# Patient Record
Sex: MALE | Race: WHITE | Employment: OTHER | URBAN - METROPOLITAN AREA
[De-identification: names, ages, dates, MRNs, and addresses within clinical notes are randomized per-mention and may not be internally consistent; named-entity substitution may affect disease eponyms.]

---

## 2020-08-05 NOTE — PROGRESS NOTES
Pre assessment phone call placed. No answer, VM left with return number for questions. Instructed of arrival time to hospital at 1030 am. Advised of one visitor allowed and NPO after midnight.

## 2020-08-06 ENCOUNTER — HOSPITAL ENCOUNTER (INPATIENT)
Dept: CARDIAC CATH/INVASIVE PROCEDURES | Age: 75
LOS: 3 days | Discharge: HOME | DRG: 244 | End: 2020-08-09
Attending: INTERNAL MEDICINE | Admitting: INTERNAL MEDICINE
Payer: MEDICARE

## 2020-08-06 DIAGNOSIS — I48.0 PAF (PAROXYSMAL ATRIAL FIBRILLATION) (HCC): ICD-10-CM

## 2020-08-06 DIAGNOSIS — Z95.820 S/P ANGIOPLASTY WITH STENT: ICD-10-CM

## 2020-08-06 DIAGNOSIS — I25.118 CORONARY ARTERY DISEASE OF NATIVE ARTERY OF NATIVE HEART WITH STABLE ANGINA PECTORIS (HCC): ICD-10-CM

## 2020-08-06 DIAGNOSIS — R94.39 ABNORMAL STRESS ECHO: ICD-10-CM

## 2020-08-06 DIAGNOSIS — Z95.0 PACEMAKER: ICD-10-CM

## 2020-08-06 DIAGNOSIS — R00.1 BRADYCARDIA: ICD-10-CM

## 2020-08-06 LAB
ANION GAP SERPL CALC-SCNC: 6 MMOL/L (ref 7–16)
ATRIAL RATE: 48 BPM
ATRIAL RATE: 86 BPM
BUN SERPL-MCNC: 14 MG/DL (ref 8–23)
CALCIUM SERPL-MCNC: 9.5 MG/DL (ref 8.3–10.4)
CALCULATED P AXIS, ECG09: 21 DEGREES
CALCULATED P AXIS, ECG09: 48 DEGREES
CALCULATED R AXIS, ECG10: 56 DEGREES
CALCULATED R AXIS, ECG10: 9 DEGREES
CALCULATED T AXIS, ECG11: -27 DEGREES
CALCULATED T AXIS, ECG11: -40 DEGREES
CHLORIDE SERPL-SCNC: 107 MMOL/L (ref 98–107)
CO2 SERPL-SCNC: 27 MMOL/L (ref 21–32)
CREAT SERPL-MCNC: 1.28 MG/DL (ref 0.8–1.5)
DIAGNOSIS, 93000: NORMAL
DIAGNOSIS, 93000: NORMAL
ERYTHROCYTE [DISTWIDTH] IN BLOOD BY AUTOMATED COUNT: 11.9 % (ref 11.9–14.6)
GLUCOSE SERPL-MCNC: 94 MG/DL (ref 65–100)
HCT VFR BLD AUTO: 49.6 % (ref 41.1–50.3)
HGB BLD-MCNC: 16.8 G/DL (ref 13.6–17.2)
INR PPP: 0.8
MAGNESIUM SERPL-MCNC: 2.2 MG/DL (ref 1.8–2.4)
MCH RBC QN AUTO: 36.1 PG (ref 26.1–32.9)
MCHC RBC AUTO-ENTMCNC: 33.9 G/DL (ref 31.4–35)
MCV RBC AUTO: 106.7 FL (ref 79.6–97.8)
NRBC # BLD: 0 K/UL (ref 0–0.2)
P-R INTERVAL, ECG05: 172 MS
P-R INTERVAL, ECG05: 188 MS
PLATELET # BLD AUTO: 228 K/UL (ref 150–450)
PMV BLD AUTO: 10.6 FL (ref 9.4–12.3)
POTASSIUM SERPL-SCNC: 3.7 MMOL/L (ref 3.5–5.1)
PROTHROMBIN TIME: 11.6 SEC (ref 12–14.7)
Q-T INTERVAL, ECG07: 350 MS
Q-T INTERVAL, ECG07: 498 MS
QRS DURATION, ECG06: 76 MS
QRS DURATION, ECG06: 80 MS
QTC CALCULATION (BEZET), ECG08: 418 MS
QTC CALCULATION (BEZET), ECG08: 444 MS
RBC # BLD AUTO: 4.65 M/UL (ref 4.23–5.6)
SODIUM SERPL-SCNC: 140 MMOL/L (ref 136–145)
VENTRICULAR RATE, ECG03: 48 BPM
VENTRICULAR RATE, ECG03: 86 BPM
WBC # BLD AUTO: 6.9 K/UL (ref 4.3–11.1)

## 2020-08-06 PROCEDURE — 65660000000 HC RM CCU STEPDOWN

## 2020-08-06 PROCEDURE — 80048 BASIC METABOLIC PNL TOTAL CA: CPT

## 2020-08-06 PROCEDURE — 93005 ELECTROCARDIOGRAM TRACING: CPT | Performed by: INTERNAL MEDICINE

## 2020-08-06 PROCEDURE — 74011000250 HC RX REV CODE- 250: Performed by: INTERNAL MEDICINE

## 2020-08-06 PROCEDURE — C1769 GUIDE WIRE: HCPCS

## 2020-08-06 PROCEDURE — C1725 CATH, TRANSLUMIN NON-LASER: HCPCS

## 2020-08-06 PROCEDURE — 85610 PROTHROMBIN TIME: CPT

## 2020-08-06 PROCEDURE — 74011000258 HC RX REV CODE- 258: Performed by: INTERNAL MEDICINE

## 2020-08-06 PROCEDURE — 74011250636 HC RX REV CODE- 250/636: Performed by: INTERNAL MEDICINE

## 2020-08-06 PROCEDURE — 93458 L HRT ARTERY/VENTRICLE ANGIO: CPT

## 2020-08-06 PROCEDURE — 77030012468 HC VLV BLEEDBK CNTRL ABBT -B

## 2020-08-06 PROCEDURE — B2151ZZ FLUOROSCOPY OF LEFT HEART USING LOW OSMOLAR CONTRAST: ICD-10-PCS | Performed by: INTERNAL MEDICINE

## 2020-08-06 PROCEDURE — 4A023N7 MEASUREMENT OF CARDIAC SAMPLING AND PRESSURE, LEFT HEART, PERCUTANEOUS APPROACH: ICD-10-PCS | Performed by: INTERNAL MEDICINE

## 2020-08-06 PROCEDURE — 92920 PRQ TRLUML C ANGIOP 1ART&/BR: CPT

## 2020-08-06 PROCEDURE — 74011636320 HC RX REV CODE- 636/320: Performed by: INTERNAL MEDICINE

## 2020-08-06 PROCEDURE — B2111ZZ FLUOROSCOPY OF MULTIPLE CORONARY ARTERIES USING LOW OSMOLAR CONTRAST: ICD-10-PCS | Performed by: INTERNAL MEDICINE

## 2020-08-06 PROCEDURE — 77030015766

## 2020-08-06 PROCEDURE — 99153 MOD SED SAME PHYS/QHP EA: CPT

## 2020-08-06 PROCEDURE — 74011250637 HC RX REV CODE- 250/637: Performed by: INTERNAL MEDICINE

## 2020-08-06 PROCEDURE — 92928 PRQ TCAT PLMT NTRAC ST 1 LES: CPT

## 2020-08-06 PROCEDURE — 027034Z DILATION OF CORONARY ARTERY, ONE ARTERY WITH DRUG-ELUTING INTRALUMINAL DEVICE, PERCUTANEOUS APPROACH: ICD-10-PCS | Performed by: INTERNAL MEDICINE

## 2020-08-06 PROCEDURE — 77030029997 HC DEV COM RDL R BND TELE -B

## 2020-08-06 PROCEDURE — 85027 COMPLETE CBC AUTOMATED: CPT

## 2020-08-06 PROCEDURE — C1874 STENT, COATED/COV W/DEL SYS: HCPCS

## 2020-08-06 PROCEDURE — 77030016699 HC CATH ANGI DX INFN1 CARD -A

## 2020-08-06 PROCEDURE — C1887 CATHETER, GUIDING: HCPCS

## 2020-08-06 PROCEDURE — 83735 ASSAY OF MAGNESIUM: CPT

## 2020-08-06 PROCEDURE — C1894 INTRO/SHEATH, NON-LASER: HCPCS

## 2020-08-06 PROCEDURE — 99152 MOD SED SAME PHYS/QHP 5/>YRS: CPT

## 2020-08-06 RX ORDER — SODIUM CHLORIDE 9 MG/ML
75 INJECTION, SOLUTION INTRAVENOUS CONTINUOUS
Status: DISCONTINUED | OUTPATIENT
Start: 2020-08-06 | End: 2020-08-09 | Stop reason: HOSPADM

## 2020-08-06 RX ORDER — MIDAZOLAM HYDROCHLORIDE 1 MG/ML
.5-2 INJECTION, SOLUTION INTRAMUSCULAR; INTRAVENOUS
Status: DISCONTINUED | OUTPATIENT
Start: 2020-08-06 | End: 2020-08-06 | Stop reason: HOSPADM

## 2020-08-06 RX ORDER — SOTALOL HYDROCHLORIDE 80 MG/1
160 TABLET ORAL EVERY 12 HOURS
Status: DISCONTINUED | OUTPATIENT
Start: 2020-08-06 | End: 2020-08-09 | Stop reason: HOSPADM

## 2020-08-06 RX ORDER — HEPARIN SODIUM 200 [USP'U]/100ML
3 INJECTION, SOLUTION INTRAVENOUS CONTINUOUS
Status: DISCONTINUED | OUTPATIENT
Start: 2020-08-06 | End: 2020-08-06 | Stop reason: HOSPADM

## 2020-08-06 RX ORDER — CLOPIDOGREL BISULFATE 75 MG/1
75 TABLET ORAL DAILY
Status: DISCONTINUED | OUTPATIENT
Start: 2020-08-07 | End: 2020-08-09 | Stop reason: HOSPADM

## 2020-08-06 RX ORDER — CLOPIDOGREL BISULFATE 75 MG/1
600 TABLET ORAL ONCE
Status: COMPLETED | OUTPATIENT
Start: 2020-08-06 | End: 2020-08-06

## 2020-08-06 RX ORDER — GUAIFENESIN 100 MG/5ML
324 LIQUID (ML) ORAL
Status: COMPLETED | OUTPATIENT
Start: 2020-08-06 | End: 2020-08-06

## 2020-08-06 RX ORDER — LIDOCAINE HYDROCHLORIDE 10 MG/ML
10-30 INJECTION, SOLUTION EPIDURAL; INFILTRATION; INTRACAUDAL; PERINEURAL ONCE
Status: COMPLETED | OUTPATIENT
Start: 2020-08-06 | End: 2020-08-06

## 2020-08-06 RX ORDER — HEPARIN SODIUM 10000 [USP'U]/ML
1000-10000 INJECTION, SOLUTION INTRAVENOUS; SUBCUTANEOUS
Status: DISCONTINUED | OUTPATIENT
Start: 2020-08-06 | End: 2020-08-06 | Stop reason: HOSPADM

## 2020-08-06 RX ORDER — LOSARTAN POTASSIUM 50 MG/1
50 TABLET ORAL DAILY
Status: DISCONTINUED | OUTPATIENT
Start: 2020-08-07 | End: 2020-08-09 | Stop reason: HOSPADM

## 2020-08-06 RX ORDER — ROSUVASTATIN CALCIUM 20 MG/1
20 TABLET, COATED ORAL
Status: DISCONTINUED | OUTPATIENT
Start: 2020-08-06 | End: 2020-08-09 | Stop reason: HOSPADM

## 2020-08-06 RX ORDER — GUAIFENESIN 100 MG/5ML
81 LIQUID (ML) ORAL DAILY
Status: DISCONTINUED | OUTPATIENT
Start: 2020-08-07 | End: 2020-08-09 | Stop reason: HOSPADM

## 2020-08-06 RX ADMIN — HEPARIN SODIUM 3 UNITS/HR: 200 INJECTION, SOLUTION INTRAVENOUS at 13:00

## 2020-08-06 RX ADMIN — ASPIRIN 324 MG: 81 TABLET, CHEWABLE ORAL at 11:45

## 2020-08-06 RX ADMIN — LIDOCAINE HYDROCHLORIDE 15 ML: 10 INJECTION, SOLUTION EPIDURAL; INFILTRATION; INTRACAUDAL; PERINEURAL at 13:00

## 2020-08-06 RX ADMIN — HEPARIN SODIUM 2 ML: 10000 INJECTION, SOLUTION INTRAVENOUS; SUBCUTANEOUS at 14:00

## 2020-08-06 RX ADMIN — IOPAMIDOL 120 ML: 755 INJECTION, SOLUTION INTRAVENOUS at 13:55

## 2020-08-06 RX ADMIN — SOTALOL HYDROCHLORIDE 160 MG: 80 TABLET ORAL at 12:08

## 2020-08-06 RX ADMIN — MIDAZOLAM HYDROCHLORIDE 2 MG: 1 INJECTION, SOLUTION INTRAMUSCULAR; INTRAVENOUS at 14:01

## 2020-08-06 RX ADMIN — ROSUVASTATIN CALCIUM 20 MG: 20 TABLET, COATED ORAL at 22:58

## 2020-08-06 RX ADMIN — SOTALOL HYDROCHLORIDE 160 MG: 80 TABLET ORAL at 22:58

## 2020-08-06 RX ADMIN — CLOPIDOGREL BISULFATE 600 MG: 75 TABLET ORAL at 13:56

## 2020-08-06 RX ADMIN — SODIUM CHLORIDE 75 ML/HR: 900 INJECTION, SOLUTION INTRAVENOUS at 11:47

## 2020-08-06 RX ADMIN — BIVALIRUDIN 1.75 MG/KG/HR: 250 INJECTION INTRACAVERNOUS at 13:38

## 2020-08-06 NOTE — ROUTINE PROCESS

## 2020-08-06 NOTE — ROUTINE PROCESS
Bedside and Verbal shift change report received from [facility] (offgoing nurse). Report included the following information SBAR, Kardex, Procedure Summary and Recent Results. Opportunity for questions provided. R radial site visualized C/D/I.

## 2020-08-06 NOTE — PROCEDURES
[facility]  CARDIAC CATH    Name:  Graciela Casper  MR#:  [id number]  :  1945  ACCOUNT #:  [de-identified]  DATE OF SERVICE:  2020    PROCEDURES PERFORMED:  Left heart cath, selective coronary angiography, left ventriculogram with stenting of a culprit LAD. PREOPERATIVE DIAGNOSIS:  Angina with abnormal stress echo. POSTOPERATIVE DIAGNOSIS:  Coronary artery disease. SURGEON:  Damari Platt MD    ASSISTANT:  None. ESTIMATED BLOOD LOSS:  2 mL. SPECIMENS REMOVED:  None. COMPLICATIONS:  None. IMPLANTS:  One drug-eluting stent. ANESTHESIA:  Sedation, 2 mg of Versed given between 13:20 and 13:55. INDICATION:  Angina. Aortic pressure 150/86. LVEDP 14. CONTRAST:  120 mL. Right radial access with a TIG-4 and pigtail were used for diagnostic images. Intervention was done with a [place] left 3.5 guide, Prowater wire, 3.0 x 22 [place] stent. FINDINGS:  Left ventriculogram done in VERAS projection shows overall EF to be approximately 50-55% with no gradient on pullback. Left main arises normally. It trifurcates into LAD, ramus, and circumflex. Left main has no disease. LAD courses the apex and has proximal mild nonobstructive 10% disease. The mid LAD has a somewhat eccentric 70-90% stenosis. The distal LAD has minimal disease. Ramus artery along the lateral wall has no significant disease. Circumflex artery in the AV groove is nondominant and supplies two OMs. There is minimal disease in the circumflex. Right coronary artery is a large dominant artery supplying a large posterior descending and posterior lateral system. The right system has mild nonobstructive luminal irregularities. The patient was given therapeutic dose of Angiomax and Prowater wire was placed in the distal LAD. Initial attempt at direct stenting was unsuccessful. Balloon angioplasty of the lesion was then performed with a 2.5 x 20-mm balloon.   Stenting was then possible with a 3.0 x 22 [place] stent deployed to 24 atmospheres. The midportion was then postdilated with a 3.0 x 12 NC to 26 atmospheres with excellent results. There were no complications. The patient will be loaded with Plavix. CONCLUSIONS:  Coronary artery disease with stenting of a mid LAD with a 3.0 x 22 [place]. He will be loaded with Plavix with the eventual treatment of Plavix and DOAC for paroxysmal AFib and new stent.       David Berman MD      [id number]  D:  08/06/2020 14:14  T:  08/06/2020 17:39  JOB #:  [id number]

## 2020-08-06 NOTE — PROGRESS NOTES
Patient received to [address] [place] [address] room # 10  Ambulatory from [facility]. Patient scheduled for LHC/PPM today with Dr Alaina Walters. Procedure reviewed & questions answered, voiced good understanding consent obtained & placed on chart. All medications and medical history reviewed. Will prep patient per orders. Patient & family updated on plan of care. The patient has a fraility score of 3-MANAGING WELL, based on ability to perform ADLs independently.

## 2020-08-06 NOTE — PROGRESS NOTES
Report received from CORBIN Nunes. Procedural findings communicated. Intra procedural  medication administration reviewed. Progression of care discussed.      Patient received into CPRU [place] 6 post sheath removal.     Right Radial access site without bleeding or swelling     TR band dry and intact     Patient instructed to limit movement to right upper extremity    Routine post procedural vital signs and site assessment initiated

## 2020-08-06 NOTE — PROCEDURES
Cardiac Catheterization Procedure Note    Patient ID:     Name: Alexia Cruz   Medical Record Number: [id number]   YOB: 1945    Date of Procedure: 8/6/2020     Pre-procedure Diagnosis:  Typical Angina    Post-procedure Diagnosis: Coronary Artery Disease    Reason for Procedure: Suspected CAD    Blood loss less than 5 ml    Sedation. Pt received 2 mg versed and 0 mcg fentanyl for monitored conscious sedation from 130to 155. Nurse bela    Specimen: None    No complications    No assistants    Time out, Mallampati, and ASA performed    Procedure:  After informed consent, patient was prepped and draped in the usual sterile fashion. Right radial approach was used. 120cc Visipaque contrast were utilized for the entire procedure. no closure device used        FINDINGS    Left Ventricle: 55  LVEDP: 14    Left Main:ok    Left Anterior descending coronary artery: mid lad 70-90       Left Circumflex coronary artery: ok        Right coronary artery: ok            Graft anatomy: na    Intervention if done: 3x22 michelle to mLAD    Conclusions: one vessel pci    Recommentations: dapt asa plavix  With PAF history will discharge on plavix eliquis    No complications    Family and or significant other were sought out and discussion of the procedure and findings took place. Procedure and findings including pertinent sequele were discussed with the patient immediately post procedure. Opportunity to ask questions was offered. If no one was available in the post procedure waiting area, I can be reached thru paging system or at [phone].           Signed By: Ev Lopez MD

## 2020-08-06 NOTE — PROGRESS NOTES
TRANSFER - OUT REPORT:    Verbal report given to cpru RN(name) on Fani Seals  being transferred to cpru(unit) for routine progression of care       Report consisted of patients Situation, Background, Assessment and   Recommendations(SBAR). Information from the following report(s) SBAR, Kardex, Procedure Summary and MAR was reviewed with the receiving nurse. Lines:   Peripheral IV 08/06/20 Left Antecubital (Active)       Peripheral IV 08/06/20 Right Antecubital (Active)        Opportunity for questions and clarification was provided.       Patient transported with:   [facility] with Dr. Carli Leong  Right radial access  Stent LAD  Versed 2mg   Angiomax 15ml bolus in @ 13:34  Aniomax 35ml/hr drip started @ 13:35 and stopped @ 13:57  Plavix 600mg po   TR Band in place @  With 14ml in the band

## 2020-08-06 NOTE — PROGRESS NOTES
TRANSFER - OUT REPORT:    Verbal report given to Michael Clayton RN (name) on [employer]  being transferred to room 303 (unit) for routine progression of care       Report consisted of patients Situation, Background, Assessment and   Recommendations(SBAR). Information from the following report(s) Procedure Summary was reviewed with the receiving nurse. Lines:   Peripheral IV 08/06/20 Left Antecubital (Active)       Peripheral IV 08/06/20 Right Antecubital (Active)        Opportunity for questions and clarification was provided.       Patient transported with:   [employer]

## 2020-08-07 ENCOUNTER — APPOINTMENT (OUTPATIENT)
Dept: GENERAL RADIOLOGY | Age: 75
DRG: 244 | End: 2020-08-07
Attending: INTERNAL MEDICINE
Payer: MEDICARE

## 2020-08-07 LAB
ATRIAL RATE: 50 BPM
ATRIAL RATE: 61 BPM
CALCULATED P AXIS, ECG09: 19 DEGREES
CALCULATED R AXIS, ECG10: -58 DEGREES
CALCULATED R AXIS, ECG10: 59 DEGREES
CALCULATED T AXIS, ECG11: -41 DEGREES
CALCULATED T AXIS, ECG11: 64 DEGREES
DIAGNOSIS, 93000: NORMAL
DIAGNOSIS, 93000: NORMAL
P-R INTERVAL, ECG05: 184 MS
P-R INTERVAL, ECG05: 228 MS
Q-T INTERVAL, ECG07: 506 MS
Q-T INTERVAL, ECG07: 544 MS
QRS DURATION, ECG06: 76 MS
QRS DURATION, ECG06: 76 MS
QTC CALCULATION (BEZET), ECG08: 495 MS
QTC CALCULATION (BEZET), ECG08: 509 MS
VENTRICULAR RATE, ECG03: 50 BPM
VENTRICULAR RATE, ECG03: 61 BPM

## 2020-08-07 PROCEDURE — 99152 MOD SED SAME PHYS/QHP 5/>YRS: CPT

## 2020-08-07 PROCEDURE — 71045 X-RAY EXAM CHEST 1 VIEW: CPT

## 2020-08-07 PROCEDURE — 80048 BASIC METABOLIC PNL TOTAL CA: CPT

## 2020-08-07 PROCEDURE — 93005 ELECTROCARDIOGRAM TRACING: CPT | Performed by: INTERNAL MEDICINE

## 2020-08-07 PROCEDURE — 65660000000 HC RM CCU STEPDOWN

## 2020-08-07 PROCEDURE — 99232 SBSQ HOSP IP/OBS MODERATE 35: CPT | Performed by: INTERNAL MEDICINE

## 2020-08-07 PROCEDURE — 74011250637 HC RX REV CODE- 250/637: Performed by: NURSE PRACTITIONER

## 2020-08-07 PROCEDURE — 02HK3JZ INSERTION OF PACEMAKER LEAD INTO RIGHT VENTRICLE, PERCUTANEOUS APPROACH: ICD-10-PCS | Performed by: INTERNAL MEDICINE

## 2020-08-07 PROCEDURE — 74011000250 HC RX REV CODE- 250: Performed by: INTERNAL MEDICINE

## 2020-08-07 PROCEDURE — 02H63JZ INSERTION OF PACEMAKER LEAD INTO RIGHT ATRIUM, PERCUTANEOUS APPROACH: ICD-10-PCS | Performed by: INTERNAL MEDICINE

## 2020-08-07 PROCEDURE — 74011250637 HC RX REV CODE- 250/637: Performed by: INTERNAL MEDICINE

## 2020-08-07 PROCEDURE — 99153 MOD SED SAME PHYS/QHP EA: CPT

## 2020-08-07 PROCEDURE — 0JH606Z INSERTION OF PACEMAKER, DUAL CHAMBER INTO CHEST SUBCUTANEOUS TISSUE AND FASCIA, OPEN APPROACH: ICD-10-PCS | Performed by: INTERNAL MEDICINE

## 2020-08-07 PROCEDURE — 74011250636 HC RX REV CODE- 250/636: Performed by: INTERNAL MEDICINE

## 2020-08-07 PROCEDURE — 85027 COMPLETE CBC AUTOMATED: CPT

## 2020-08-07 PROCEDURE — 83735 ASSAY OF MAGNESIUM: CPT

## 2020-08-07 PROCEDURE — 36415 COLL VENOUS BLD VENIPUNCTURE: CPT

## 2020-08-07 PROCEDURE — 33208 INSRT HEART PM ATRIAL & VENT: CPT

## 2020-08-07 RX ORDER — FENTANYL CITRATE 50 UG/ML
25-75 INJECTION, SOLUTION INTRAMUSCULAR; INTRAVENOUS
Status: DISCONTINUED | OUTPATIENT
Start: 2020-08-07 | End: 2020-08-09 | Stop reason: HOSPADM

## 2020-08-07 RX ORDER — CEFAZOLIN SODIUM/WATER 2 G/20 ML
2 SYRINGE (ML) INTRAVENOUS EVERY 8 HOURS
Status: COMPLETED | OUTPATIENT
Start: 2020-08-07 | End: 2020-08-08

## 2020-08-07 RX ORDER — SODIUM CHLORIDE 0.9 % (FLUSH) 0.9 %
5-40 SYRINGE (ML) INJECTION AS NEEDED
Status: DISCONTINUED | OUTPATIENT
Start: 2020-08-07 | End: 2020-08-09 | Stop reason: HOSPADM

## 2020-08-07 RX ORDER — CEFAZOLIN SODIUM/WATER 2 G/20 ML
2 SYRINGE (ML) INTRAVENOUS ONCE
Status: COMPLETED | OUTPATIENT
Start: 2020-08-07 | End: 2020-08-07

## 2020-08-07 RX ORDER — MIDAZOLAM HYDROCHLORIDE 1 MG/ML
.5-2 INJECTION, SOLUTION INTRAMUSCULAR; INTRAVENOUS
Status: DISCONTINUED | OUTPATIENT
Start: 2020-08-07 | End: 2020-08-09 | Stop reason: HOSPADM

## 2020-08-07 RX ORDER — LIDOCAINE HYDROCHLORIDE 10 MG/ML
1-30 INJECTION, SOLUTION EPIDURAL; INFILTRATION; INTRACAUDAL; PERINEURAL ONCE
Status: COMPLETED | OUTPATIENT
Start: 2020-08-07 | End: 2020-08-07

## 2020-08-07 RX ORDER — POTASSIUM CHLORIDE 20 MEQ/1
20 TABLET, EXTENDED RELEASE ORAL
Status: COMPLETED | OUTPATIENT
Start: 2020-08-07 | End: 2020-08-07

## 2020-08-07 RX ORDER — SODIUM CHLORIDE 0.9 % (FLUSH) 0.9 %
5-40 SYRINGE (ML) INJECTION EVERY 8 HOURS
Status: DISCONTINUED | OUTPATIENT
Start: 2020-08-07 | End: 2020-08-09 | Stop reason: HOSPADM

## 2020-08-07 RX ADMIN — LOSARTAN POTASSIUM 50 MG: 50 TABLET, FILM COATED ORAL at 11:17

## 2020-08-07 RX ADMIN — LIDOCAINE HYDROCHLORIDE 30 ML: 10 INJECTION, SOLUTION EPIDURAL; INFILTRATION; INTRACAUDAL; PERINEURAL at 09:51

## 2020-08-07 RX ADMIN — ROSUVASTATIN CALCIUM 20 MG: 20 TABLET, COATED ORAL at 22:39

## 2020-08-07 RX ADMIN — SOTALOL HYDROCHLORIDE 160 MG: 80 TABLET ORAL at 11:17

## 2020-08-07 RX ADMIN — CEFAZOLIN SODIUM 2 G: 100 INJECTION, POWDER, LYOPHILIZED, FOR SOLUTION INTRAVENOUS at 09:20

## 2020-08-07 RX ADMIN — MIDAZOLAM 1 MG: 1 INJECTION INTRAMUSCULAR; INTRAVENOUS at 09:48

## 2020-08-07 RX ADMIN — FENTANYL CITRATE 25 MCG: 50 INJECTION INTRAMUSCULAR; INTRAVENOUS at 09:41

## 2020-08-07 RX ADMIN — SOTALOL HYDROCHLORIDE 160 MG: 80 TABLET ORAL at 22:37

## 2020-08-07 RX ADMIN — MIDAZOLAM 1 MG: 1 INJECTION INTRAMUSCULAR; INTRAVENOUS at 09:40

## 2020-08-07 RX ADMIN — Medication 10 ML: at 17:15

## 2020-08-07 RX ADMIN — CLOPIDOGREL BISULFATE 75 MG: 75 TABLET ORAL at 11:17

## 2020-08-07 RX ADMIN — CEFAZOLIN SODIUM 2 G: 100 INJECTION, POWDER, LYOPHILIZED, FOR SOLUTION INTRAVENOUS at 17:00

## 2020-08-07 RX ADMIN — ASPIRIN 81 MG: 81 TABLET, CHEWABLE ORAL at 11:17

## 2020-08-07 RX ADMIN — POTASSIUM CHLORIDE 20 MEQ: 20 TABLET, EXTENDED RELEASE ORAL at 23:05

## 2020-08-07 RX ADMIN — Medication 10 ML: at 22:39

## 2020-08-07 NOTE — ROUTINE PROCESS
Verbal bedside report given to [facility], oncoming RN. Patient's situation, background, assessment and recommendations provided. Opportunity for questions provided. Oncoming RN assumed care of patient.

## 2020-08-07 NOTE — PROGRESS NOTES
TRANSFER - OUT REPORT:    Verbal report given to Chinyere Foster RN on [facility]  being transferred to 3rd floor for routine progression of care       Report consisted of patients Situation, Background, Assessment and Recommendations(SBAR). Information from the following report(s) SBAR, Kardex, Procedure Summary and MAR was reviewed with the receiving nurse. Opportunity for questions and clarification was provided. Left subclavian incision s/p PPM - aquacel dressing intact - oozing noted and outlined.

## 2020-08-07 NOTE — PROCEDURES
Cardiac Catheterization Procedure Note pacer/loop    Patient ID:     Name: Danitza Johnson   Medical Record Number: [id number]   YOB: 1945    Date of Procedure: 8/7/2020    Physician: Dora Cronin MD    Referring bittrick    Indications: This is a 66 yrs male who presents with irreversible symptomatic bradycardia. Pre procedure dx SSS  Post procedure dx Placement of dual chamber biotronik MRI  compatible system    Blood loss less than 5 ml    Sedation. Pt received 2 mg versed and 0 mcg fentanyl for monitored conscious sedation from 10:00 to 10:30. Specimen: None    No complications    No assistants    Time out, Mallampati, and ASA performed    Procedure: left pectoral region was cleaned and prepped in a sterile fashion. Lidocaine was used for local anesthesia. Modified Seldinger technique was used to gain access to the subclavian vein. Pacer pocket was made with sharp and blunt dissection. Sheaths were placed which allowed for placement of a dual chamber device. After appropriate placement of leads the sheaths were removed and leads were attached to the pre pectoral fascia. The pocket was flushed with antibiotic solution. The device was attached to the appropriate leads and set screws were tightened. Closure was then performored with 2.0 V Jon and 3.0 V Jon to close the skin. Retention suture was not used to secure pulse generator to the pectoral fascia    All counts were correct    Dressing was applied to the skin    Pulse generator was a EDORA 8 DRT serial number [id number] . Right Atrial lead was a SOLIA S53 serial number [id number]   Threshold 0.7 V @ 0.4 ms   A wave 6.3mV   Impedence 585 ohms    Right Ventricular lead was a SOLIA S60 serial number [id number]    Threshold 0.5 V @ 0.4 ms   V wave 10.9 mV   [id number] ohms    Settings DDD-CLS 60/120        Family and our significant other were sought out and discussion of the procedure and findings took place.  Procedure and findings including pertinent sequele were discussed with the patient immediately post procedure. Opportunity to ask questions was offered. If no one was available in the post procedure waiting area, I can be reached thru paging system or at [phone].

## 2020-08-07 NOTE — ROUTINE PROCESS
Cardiac Rehab: Chart review completed. Pt post PCI 8/6/20. Pt off the unit for procedure. I will follow and see pt when appropriate.

## 2020-08-07 NOTE — PROGRESS NOTES
TRANSFER - IN REPORT:    Verbal report received from Adele Pope RN on [employer] being received from [facility] for routine progression of care      Report consisted of patients Situation, Background, Assessment and Recommendations(SBAR). Information from the following report(s) Procedure Summary was reviewed with the receiving nurse. Opportunity for questions and clarification was provided. Assessment completed upon patients arrival to unit and care assumed.

## 2020-08-07 NOTE — PROGRESS NOTES
am  8/7/2020 6:51 AM    Admit Date: 8/6/2020    Admit Diagnosis: SSS (sick sinus syndrome) (HCC) [I49.5];HTN (hypertension) [I10]; Pacemaker [Z95.0]      Subjective:    Patient with several issues. Has PAF and symptomatic SSS and is getting sotalol load. apso had abnormal stress echo and had mLAD stented. He is doing well. Sinus giovanny.  meds    Objective:      Visit Vitals  /74 (BP 1 Location: Right arm, BP Patient Position: At rest)   Pulse (!) 50   Temp 97.7 °F (36.5 °C)   Resp 18   Ht 6' 1\" (1.854 m)   Wt 209 lb 12.8 oz (95.2 kg)   SpO2 96%   BMI 27.68 kg/m²       ROS:  General ROS: negative for - chills  Hematological and Lymphatic ROS: negative for - blood clots or jaundice  Respiratory ROS: no cough, shortness of breath, or wheezing  Cardiovascular ROS: no chest pain or dyspnea on exertion  Gastrointestinal ROS: no abdominal pain, change in bowel habits, or black or bloody stools  Neurological ROS: no TIA or stroke symptoms    Physical Exam:    Physical Examination: General appearance - alert, well appearing, and in no distress  Mental status - alert, oriented to person, place, and time  Eyes - pupils equal and reactive, extraocular eye movements intact  Neck/lymph - supple, no significant adenopathy  Chest/CV - clear to auscultation, no wheezes, rales or rhonchi, symmetric air entry  Heart - normal rate, regular rhythm, normal S1, S2, no murmurs, rubs, clicks or gallops  Abdomen/GI - soft, nontender, nondistended, no masses or organomegaly  Musculoskeletal - no joint tenderness, deformity or swelling  Extremities - peripheral pulses normal, no pedal edema, no clubbing or cyanosis  Skin - normal coloration and turgor, no rashes, no suspicious skin lesions noted    Current Facility-Administered Medications   Medication Dose Route Frequency    sotaloL (BETAPACE) tablet 160 mg  160 mg Oral Q12H    0.9% sodium chloride infusion  75 mL/hr IntraVENous CONTINUOUS    bivalirudin (ANGIOMAX) 250 mg in 0.9% sodium chloride (MBP/ADV) 50 mL infusion  1.75 mg/kg/hr IntraVENous CONTINUOUS    clopidogreL (PLAVIX) tablet 75 mg  75 mg Oral DAILY    aspirin chewable tablet 81 mg  81 mg Oral DAILY    rosuvastatin (CRESTOR) tablet 20 mg  20 mg Oral QHS    losartan (COZAAR) tablet 50 mg  50 mg Oral DAILY       Data Review:   @LABRCNT(Na,K,BUN,CREA,WBC,HGB,HCT,PLT,INR,TRP,TCHOL*,Triglyceride*,LDL*,LDLCPOC HDL*,HDL])@    TELEMETRY: SB    Assessment/Plan:     Active Problems:    Pacemaker (8/6/2020)      Cad  PAF  SSS    Continue sotalol load  Plan PPM today  plavix and eliquis for stent and PAF  Thyroids ok  Carolyn Crocker Doctor, MD

## 2020-08-07 NOTE — PROGRESS NOTES
LMSW met with pt and spouse at [facility]. Couple deny any supportive care concerns at this time. They live part of the year in [place] and have a PCP there. Their primary residence is in [facility] [facility]. They will return to [place] at D/C. Will follow plan of care and assist if needs arise. Care Management Interventions  PCP Verified by CM: Yes(Dedra Plascencia in [place] [facility] [place] and a PCP in [place])  Mode of Transport at Discharge: (spouse)  Transition of Care Consult (CM Consult): Discharge Planning(Pt is insured with pharmacy benefits.)  Discharge Durable Medical Equipment: No  Physical Therapy Consult: No  Occupational Therapy Consult: No  Speech Therapy Consult: No  Current Support Network: Lives with Spouse, Own Home(Pt and spouse live between [address] [place] [address] and [place].   They consider [place] address as their primary address.  )  Confirm Follow Up Transport: Family  Name of the Patient Representative Who was Provided with a Choice of Provider and Agrees with the Discharge Plan: pt/spouse  [facility] Information Provided?: No  Discharge Location  Discharge Placement: Home

## 2020-08-07 NOTE — ROUTINE PROCESS
Verbal bedside report received from [facility]. Assumed care of patient. Left subclavian site visualized at bedside with outgoing RN.

## 2020-08-07 NOTE — ROUTINE PROCESS
Bedside and verbal report received from Gerardo Hobbs 
Pt NPO for procedure. Consent placed in chart

## 2020-08-07 NOTE — ROUTINE PROCESS
TRANSFER - IN REPORT: 
 
Verbal report received from Shelly Manzo RN on [employer] being received from [facility] for routine progression of care Report consisted of patients Situation, Background, Assessment and Recommendations(SBAR). Information from the following report(s) Procedure Summary was reviewed with the receiving nurse. Opportunity for questions and clarification was provided. Assessment completed upon patients arrival to unit and care assumed.

## 2020-08-07 NOTE — ROUTINE PROCESS
TRANSFER - OUT REPORT: 
 
DCP Dr. Abena Leiva Left subclavian access Versed 2mg, fentanyl 25mcg, Left sided implant Pacer settings DDDR  Pocket and skin closed with sutures Site dressed with primaseal and sling placed on left arm Verbal report given to Siobhan HARVEY(name) on [employer]  being transferred to cpru(unit) for routine progression of care Report consisted of patients Situation, Background, Assessment and  
Recommendations(SBAR). Information from the following report(s) Procedure Summary was reviewed with the receiving nurse. Lines:  
Peripheral IV 08/06/20 Right Antecubital (Active) Site Assessment Clean, dry, & intact 08/07/20 0445 Phlebitis Assessment 0 08/07/20 0445 Infiltration Assessment 0 08/07/20 0445 Dressing Status Clean, dry, & intact 08/07/20 0445 Dressing Type Transparent 08/07/20 5507 Hub Color/Line Status Patent; Flushed 08/07/20 0445 Alcohol Cap Used No 08/07/20 0445 Opportunity for questions and clarification was provided. Patient transported with: 
 [facility]

## 2020-08-08 PROBLEM — R00.1 BRADYCARDIA: Status: ACTIVE | Noted: 2020-08-08

## 2020-08-08 PROBLEM — I48.0 PAF (PAROXYSMAL ATRIAL FIBRILLATION) (HCC): Status: ACTIVE | Noted: 2020-08-08

## 2020-08-08 LAB
ANION GAP SERPL CALC-SCNC: 7 MMOL/L (ref 7–16)
ANION GAP SERPL CALC-SCNC: 8 MMOL/L (ref 7–16)
ATRIAL RATE: 61 BPM
ATRIAL RATE: 65 BPM
ATRIAL RATE: 67 BPM
BUN SERPL-MCNC: 14 MG/DL (ref 8–23)
BUN SERPL-MCNC: 15 MG/DL (ref 8–23)
CALCIUM SERPL-MCNC: 8.3 MG/DL (ref 8.3–10.4)
CALCIUM SERPL-MCNC: 8.6 MG/DL (ref 8.3–10.4)
CALCULATED P AXIS, ECG09: 106 DEGREES
CALCULATED P AXIS, ECG09: 17 DEGREES
CALCULATED R AXIS, ECG10: 18 DEGREES
CALCULATED R AXIS, ECG10: 23 DEGREES
CALCULATED R AXIS, ECG10: 4 DEGREES
CALCULATED T AXIS, ECG11: -11 DEGREES
CALCULATED T AXIS, ECG11: -31 DEGREES
CALCULATED T AXIS, ECG11: -46 DEGREES
CHLORIDE SERPL-SCNC: 107 MMOL/L (ref 98–107)
CHLORIDE SERPL-SCNC: 108 MMOL/L (ref 98–107)
CO2 SERPL-SCNC: 23 MMOL/L (ref 21–32)
CO2 SERPL-SCNC: 23 MMOL/L (ref 21–32)
CREAT SERPL-MCNC: 1.02 MG/DL (ref 0.8–1.5)
CREAT SERPL-MCNC: 1.08 MG/DL (ref 0.8–1.5)
DIAGNOSIS, 93000: NORMAL
ERYTHROCYTE [DISTWIDTH] IN BLOOD BY AUTOMATED COUNT: 11.9 % (ref 11.9–14.6)
GLUCOSE SERPL-MCNC: 96 MG/DL (ref 65–100)
GLUCOSE SERPL-MCNC: 97 MG/DL (ref 65–100)
HCT VFR BLD AUTO: 44.1 % (ref 41.1–50.3)
HGB BLD-MCNC: 15.6 G/DL (ref 13.6–17.2)
MAGNESIUM SERPL-MCNC: 2.2 MG/DL (ref 1.8–2.4)
MAGNESIUM SERPL-MCNC: 2.4 MG/DL (ref 1.8–2.4)
MCH RBC QN AUTO: 36.6 PG (ref 26.1–32.9)
MCHC RBC AUTO-ENTMCNC: 35.4 G/DL (ref 31.4–35)
MCV RBC AUTO: 103.5 FL (ref 79.6–97.8)
NRBC # BLD: 0 K/UL (ref 0–0.2)
P-R INTERVAL, ECG05: 192 MS
P-R INTERVAL, ECG05: 220 MS
P-R INTERVAL, ECG05: 224 MS
PLATELET # BLD AUTO: 183 K/UL (ref 150–450)
PMV BLD AUTO: 10.2 FL (ref 9.4–12.3)
POTASSIUM SERPL-SCNC: 3.8 MMOL/L (ref 3.5–5.1)
POTASSIUM SERPL-SCNC: 3.8 MMOL/L (ref 3.5–5.1)
Q-T INTERVAL, ECG07: 460 MS
Q-T INTERVAL, ECG07: 478 MS
Q-T INTERVAL, ECG07: 488 MS
QRS DURATION, ECG06: 74 MS
QRS DURATION, ECG06: 74 MS
QRS DURATION, ECG06: 82 MS
QTC CALCULATION (BEZET), ECG08: 486 MS
QTC CALCULATION (BEZET), ECG08: 491 MS
QTC CALCULATION (BEZET), ECG08: 497 MS
RBC # BLD AUTO: 4.26 M/UL (ref 4.23–5.6)
SODIUM SERPL-SCNC: 138 MMOL/L (ref 136–145)
SODIUM SERPL-SCNC: 138 MMOL/L (ref 136–145)
VENTRICULAR RATE, ECG03: 61 BPM
VENTRICULAR RATE, ECG03: 65 BPM
VENTRICULAR RATE, ECG03: 67 BPM
WBC # BLD AUTO: 6.6 K/UL (ref 4.3–11.1)

## 2020-08-08 PROCEDURE — 74011250637 HC RX REV CODE- 250/637: Performed by: NURSE PRACTITIONER

## 2020-08-08 PROCEDURE — 74011250636 HC RX REV CODE- 250/636: Performed by: INTERNAL MEDICINE

## 2020-08-08 PROCEDURE — 93005 ELECTROCARDIOGRAM TRACING: CPT | Performed by: INTERNAL MEDICINE

## 2020-08-08 PROCEDURE — 65660000000 HC RM CCU STEPDOWN

## 2020-08-08 PROCEDURE — 74011250637 HC RX REV CODE- 250/637: Performed by: INTERNAL MEDICINE

## 2020-08-08 PROCEDURE — 74011000250 HC RX REV CODE- 250: Performed by: INTERNAL MEDICINE

## 2020-08-08 PROCEDURE — 83735 ASSAY OF MAGNESIUM: CPT

## 2020-08-08 PROCEDURE — 99232 SBSQ HOSP IP/OBS MODERATE 35: CPT | Performed by: INTERNAL MEDICINE

## 2020-08-08 PROCEDURE — 80048 BASIC METABOLIC PNL TOTAL CA: CPT

## 2020-08-08 RX ORDER — POTASSIUM CHLORIDE 20 MEQ/1
20 TABLET, EXTENDED RELEASE ORAL
Status: COMPLETED | OUTPATIENT
Start: 2020-08-08 | End: 2020-08-08

## 2020-08-08 RX ADMIN — ROSUVASTATIN CALCIUM 20 MG: 20 TABLET, COATED ORAL at 21:00

## 2020-08-08 RX ADMIN — Medication 5 ML: at 20:59

## 2020-08-08 RX ADMIN — LOSARTAN POTASSIUM 50 MG: 50 TABLET, FILM COATED ORAL at 09:08

## 2020-08-08 RX ADMIN — SOTALOL HYDROCHLORIDE 160 MG: 80 TABLET ORAL at 20:57

## 2020-08-08 RX ADMIN — ASPIRIN 81 MG: 81 TABLET, CHEWABLE ORAL at 09:08

## 2020-08-08 RX ADMIN — CEFAZOLIN SODIUM 2 G: 100 INJECTION, POWDER, LYOPHILIZED, FOR SOLUTION INTRAVENOUS at 00:29

## 2020-08-08 RX ADMIN — POTASSIUM CHLORIDE 20 MEQ: 20 TABLET, EXTENDED RELEASE ORAL at 02:49

## 2020-08-08 RX ADMIN — Medication 10 ML: at 05:48

## 2020-08-08 RX ADMIN — Medication 5 ML: at 14:30

## 2020-08-08 RX ADMIN — CLOPIDOGREL BISULFATE 75 MG: 75 TABLET ORAL at 09:08

## 2020-08-08 RX ADMIN — SOTALOL HYDROCHLORIDE 160 MG: 80 TABLET ORAL at 09:07

## 2020-08-08 NOTE — PROGRESS NOTES
Assisting primary RN with med pass. Pt on Sotolal load. Daily labs were not ordered today. Conor Lowe NP, notified. Last labs drawn 8/6: K 3.7 and Mg 2.2. QTC from today 491. Orders received to administer 160 mg dose of Sotolal now and to draw STAT labs. Pt with two working IVs; call light in reach.        2239: Orders received for 20 mEq of Potassium now per Conor Lowe NP.

## 2020-08-08 NOTE — PROGRESS NOTES
8/8/2020 9:59 AM    Admit Date: 8/6/2020        Subjective:     Erik Chew denies chest pain, dyspnea, palpitations. Had pacer started on betapace for PAF 4 doses so far            Objective:      Visit Vitals  /72   Pulse 80   Temp 97.8 °F (36.6 °C)   Resp 14   Ht 6' 1\" (1.854 m)   Wt 207 lb 4.8 oz (94 kg)   SpO2 97%   BMI 27.35 kg/m²       Physical Exam:  Heart: regular rate and rhythm  Lungs: clear to auscultation bilaterally  Abdomen: soft, non-tender.  Bowel sounds normal. No masses,  no organomegaly  Extremities: extremities normal, atraumatic, no cyanosis or edema    Data Review:   Labs:    Recent Results (from the past 24 hour(s))   EKG, 12 LEAD, SUBSEQUENT    Collection Time: 08/07/20  1:12 PM   Result Value Ref Range    Ventricular Rate 61 BPM    Atrial Rate 61 BPM    P-R Interval 228 ms    QRS Duration 76 ms    Q-T Interval 506 ms    QTC Calculation (Bezet) 509 ms    Calculated R Axis -58 degrees    Calculated T Axis 64 degrees    Diagnosis       Atrial-paced rhythm with prolonged AV conduction  Left axis deviation  Septal infarct , age undetermined  Prolonged QT  Abnormal ECG  When compared with ECG of 07-AUG-2020 01:07,  Significant changes have occurred  Confirmed by DAVID PINA (), Neftali Montiel ([id number]) on 8/7/2020 1:35:24 PM     EKG, 12 LEAD, SUBSEQUENT    Collection Time: 08/07/20  3:59 PM   Result Value Ref Range    Ventricular Rate 61 BPM    Atrial Rate 61 BPM    P-R Interval 224 ms    QRS Duration 74 ms    Q-T Interval 488 ms    QTC Calculation (Bezet) 491 ms    Calculated R Axis 4 degrees    Calculated T Axis -31 degrees    Diagnosis       Atrial-paced rhythm with prolonged AV conduction  Nonspecific ST abnormality  Prolonged QT  Abnormal ECG  When compared with ECG of 07-AUG-2020 13:12,  QRS axis Shifted right  Criteria for Septal infarct are no longer Present  ST now depressed in Inferior leads  T wave inversion now evident in Inferior leads     METABOLIC PANEL, BASIC    Collection Time: 08/07/20 11:55 PM   Result Value Ref Range    Sodium 138 136 - 145 mmol/L    Potassium 3.8 3.5 - 5.1 mmol/L    Chloride 108 (H) 98 - 107 mmol/L    CO2 23 21 - 32 mmol/L    Anion gap 7 7 - 16 mmol/L    Glucose 96 65 - 100 mg/dL    BUN 15 8 - 23 MG/DL    Creatinine 1.02 0.8 - 1.5 MG/DL    GFR est AA >60 >60 ml/min/1.73m2    GFR est non-AA >60 >60 ml/min/1.73m2    Calcium 8.3 8.3 - 10.4 MG/DL   CBC W/O DIFF    Collection Time: 08/07/20 11:55 PM   Result Value Ref Range    WBC 6.6 4.3 - 11.1 K/uL    RBC 4.26 4.23 - 5.6 M/uL    HGB 15.6 13.6 - 17.2 g/dL    HCT 44.1 41.1 - 50.3 %    .5 (H) 79.6 - 97.8 FL    MCH 36.6 (H) 26.1 - 32.9 PG    MCHC 35.4 (H) 31.4 - 35.0 g/dL    RDW 11.9 11.9 - 14.6 %    PLATELET 271 653 - 669 K/uL    MPV 10.2 9.4 - 12.3 FL    ABSOLUTE NRBC 0.00 0.0 - 0.2 K/uL   MAGNESIUM    Collection Time: 08/07/20 11:55 PM   Result Value Ref Range    Magnesium 2.2 1.8 - 2.4 mg/dL   METABOLIC PANEL, BASIC    Collection Time: 08/08/20  3:23 AM   Result Value Ref Range    Sodium 138 136 - 145 mmol/L    Potassium 3.8 3.5 - 5.1 mmol/L    Chloride 107 98 - 107 mmol/L    CO2 23 21 - 32 mmol/L    Anion gap 8 7 - 16 mmol/L    Glucose 97 65 - 100 mg/dL    BUN 14 8 - 23 MG/DL    Creatinine 1.08 0.8 - 1.5 MG/DL    GFR est AA >60 >60 ml/min/1.73m2    GFR est non-AA >60 >60 ml/min/1.73m2    Calcium 8.6 8.3 - 10.4 MG/DL   MAGNESIUM    Collection Time: 08/08/20  3:23 AM   Result Value Ref Range    Magnesium 2.4 1.8 - 2.4 mg/dL       Telemetry: normal sinus rhythm    CXR no pneumo  EKG     Assessment:     Patient Active Problem List    Diagnosis Date Noted    Pacemaker stable 08/06/2020     Priority: 1 - One    PAF (paroxysmal atrial fibrillation) (HCC) loading with betapace 08/08/2020    Bradycardia post pacer 08/08/2020       Plan:    load with betapace home Sunday

## 2020-08-09 VITALS
HEIGHT: 73 IN | OXYGEN SATURATION: 97 % | WEIGHT: 207.6 LBS | BODY MASS INDEX: 27.51 KG/M2 | RESPIRATION RATE: 20 BRPM | SYSTOLIC BLOOD PRESSURE: 129 MMHG | TEMPERATURE: 98 F | DIASTOLIC BLOOD PRESSURE: 62 MMHG | HEART RATE: 90 BPM

## 2020-08-09 PROBLEM — I25.118 CORONARY ARTERY DISEASE OF NATIVE ARTERY OF NATIVE HEART WITH STABLE ANGINA PECTORIS (HCC): Status: ACTIVE | Noted: 2020-08-09

## 2020-08-09 PROBLEM — R94.39 ABNORMAL STRESS ECHO: Status: ACTIVE | Noted: 2020-08-09

## 2020-08-09 PROBLEM — Z95.820 S/P ANGIOPLASTY WITH STENT: Status: ACTIVE | Noted: 2020-08-09

## 2020-08-09 LAB
ANION GAP SERPL CALC-SCNC: 10 MMOL/L (ref 7–16)
ATRIAL RATE: 74 BPM
BUN SERPL-MCNC: 16 MG/DL (ref 8–23)
CALCIUM SERPL-MCNC: 8.6 MG/DL (ref 8.3–10.4)
CALCULATED P AXIS, ECG09: 36 DEGREES
CALCULATED R AXIS, ECG10: 29 DEGREES
CALCULATED T AXIS, ECG11: 13 DEGREES
CHLORIDE SERPL-SCNC: 107 MMOL/L (ref 98–107)
CO2 SERPL-SCNC: 21 MMOL/L (ref 21–32)
CREAT SERPL-MCNC: 1.09 MG/DL (ref 0.8–1.5)
DIAGNOSIS, 93000: NORMAL
GLUCOSE SERPL-MCNC: 102 MG/DL (ref 65–100)
MAGNESIUM SERPL-MCNC: 2.4 MG/DL (ref 1.8–2.4)
P-R INTERVAL, ECG05: 212 MS
POTASSIUM SERPL-SCNC: 4 MMOL/L (ref 3.5–5.1)
Q-T INTERVAL, ECG07: 420 MS
QRS DURATION, ECG06: 78 MS
QTC CALCULATION (BEZET), ECG08: 466 MS
SODIUM SERPL-SCNC: 138 MMOL/L (ref 136–145)
VENTRICULAR RATE, ECG03: 74 BPM

## 2020-08-09 PROCEDURE — 99238 HOSP IP/OBS DSCHRG MGMT 30/<: CPT | Performed by: INTERNAL MEDICINE

## 2020-08-09 PROCEDURE — 36415 COLL VENOUS BLD VENIPUNCTURE: CPT

## 2020-08-09 PROCEDURE — 80048 BASIC METABOLIC PNL TOTAL CA: CPT

## 2020-08-09 PROCEDURE — 83735 ASSAY OF MAGNESIUM: CPT

## 2020-08-09 PROCEDURE — 74011250637 HC RX REV CODE- 250/637: Performed by: INTERNAL MEDICINE

## 2020-08-09 RX ORDER — CLOPIDOGREL BISULFATE 75 MG/1
75 TABLET ORAL DAILY
Qty: 30 TAB | Refills: 11 | Status: SHIPPED | OUTPATIENT
Start: 2020-08-09

## 2020-08-09 RX ORDER — GUAIFENESIN 100 MG/5ML
81 LIQUID (ML) ORAL DAILY
Qty: 30 TAB | Refills: 11 | Status: SHIPPED | COMMUNITY
Start: 2020-08-09

## 2020-08-09 RX ORDER — SOTALOL HYDROCHLORIDE 160 MG/1
160 TABLET ORAL EVERY 12 HOURS
Qty: 60 TAB | Refills: 11 | Status: SHIPPED | OUTPATIENT
Start: 2020-08-09

## 2020-08-09 RX ORDER — LOSARTAN POTASSIUM 50 MG/1
50 TABLET ORAL DAILY
Qty: 30 TAB | Refills: 5 | Status: SHIPPED | OUTPATIENT
Start: 2020-08-09

## 2020-08-09 RX ORDER — ROSUVASTATIN CALCIUM 20 MG/1
20 TABLET, COATED ORAL
Qty: 30 TAB | Refills: 11 | Status: SHIPPED | OUTPATIENT
Start: 2020-08-09

## 2020-08-09 RX ADMIN — CLOPIDOGREL BISULFATE 75 MG: 75 TABLET ORAL at 08:17

## 2020-08-09 RX ADMIN — LOSARTAN POTASSIUM 50 MG: 50 TABLET, FILM COATED ORAL at 08:17

## 2020-08-09 RX ADMIN — Medication 10 ML: at 05:47

## 2020-08-09 RX ADMIN — ASPIRIN 81 MG: 81 TABLET, CHEWABLE ORAL at 08:17

## 2020-08-09 RX ADMIN — SOTALOL HYDROCHLORIDE 160 MG: 80 TABLET ORAL at 08:17

## 2020-08-09 NOTE — DISCHARGE SUMMARY
[place] [place] [facility] [place] [facility] Discharge Summary     Patient ID:  Belen Romberg  [id number]  66 y.o.  1945    Admit date: 8/6/2020    Discharge date:  08/09/20     Admitting Physician: Glory Bruce MD     Discharge Physician: Josephine Manning NP/Dr. Horace Alfonso    Admission Diagnoses: SSS (sick sinus syndrome) ([facility] [address] [I49.5]  HTN (hypertension) [I10]  Pacemaker [Z95.0]    Discharge Diagnoses:   Patient Active Problem List    Diagnosis Date Noted    Pacemaker 08/06/2020     Priority: 1 - One    Abnormal stress echo 08/09/2020    PAF (paroxysmal atrial fibrillation) ([facility] [address] 08/08/2020    Bradycardia 08/08/2020       Cardiology Procedures this admission:  Left heart catheterization with PCI and pacemaker, device interrogation and Sotalol load  Consults: None    Hospital Course: Patient was seen at the office of [place] [place] [facility] [place] [facility] by Dr. Kelsie Shea for complaints of SOB and lack of energy. He has h/o probable AF and SSS meeting criteria for tachy giovanny per Dr. Kelsie Shea. He was scheduled for stress echo that was abnormal and was subsequently scheduled for a LHC at [facility] on 8/6/2020. Patient underwent cardiac catheterization by Dr. Kelsie Shea. Patient was found to have a 70-90% stenosis of the mLAD that was stented with a 3.0 x 22 Jerald MONICA with 0% residual stenosis. Patient tolerated the procedure well and was taken to the telemetry floor for recovery. The patient underwent placement of dual chamber Biotronik MRI compatible on 8/7/2020 by Dr. Kelsie Shea. Follow up CXR showed no pneumothorax. Pt tolerated the procedure well and was taken to the telemetry floor for recovery. The patient was started on sotalol load on admission. The following day his device was interrogated showing normal function. Pt was monitored on telemetry for 6 doses of sotalol with EKGs showing acceptable Qtc intervals. Pt's left subclavian PM site was clean, dry and intact without hematoma.   Patient's right radial cath site was clean, dry and intact without hematoma or bruit. Pt's labs were WNL. Pt was seen and examined by Dr. Kerri Abreu and determined stable and ready for discharge. Patient was instructed on the importance of medication compliance including taking aspirin and plavix everyday without missing a dose. After receiving drug eluting stents, the patient will remain on dual anti-platelet therapy for 1 year. For maximized medical therapy for CAD, patient will continue ARB and statin as well. The patient has been referred to cardiac rehab. Reviewed Dr. Vicente Kilpatrick office note and probable A. Fib. Will leave on DAPT and let Dr. Danny Mckenna reassess need for NOAC and stopping ASA at f/u appt if needed. Pt was instructed to follow PM discharge instructions given by nursing staff. The patient will see device clinic in 10-14 days (device clinic will call patient tomorrow with appt time). DISPOSITION: The patient is being discharged home in stable condition on a low saturated fat, low cholesterol and low salt diet. Pt is instructed to advance activities as tolerated limited to fatigue or shortness of breath. Pt is instructed not to lift anything over 5-10 lbs with affected arm. No pushing or pulling or lifting arm above shoulder. See complete discharge instructions. Pt is instructed to watch PM site for bleeding/oozing, if seen Pt is instructed to apply firm pressure with clean cloth and call office at [phone]. Pt is instructed to watch for signs of infection which include increasing area of redness around site, fever/hot to touch or purulent drainage. Pt is instructed to call office or return to ER for immediate evaluation of any shortness of breath, chest pain or palpitations.       Discharge Exam:   Visit Vitals  /62 (BP 1 Location: Right leg)   Pulse 90   Temp 98 °F (36.7 °C)   Resp 20   Ht 6' 1\" (1.854 m)   Wt 94.2 kg (207 lb 9.6 oz)   SpO2 97%   BMI 27.39 kg/m²     Patient has been seen by Dr. Kerri Abreu: see his progress note for exam details.     Recent Results (from the past 24 hour(s))   EKG, 12 LEAD, SUBSEQUENT    Collection Time: 08/08/20 11:00 AM   Result Value Ref Range    Ventricular Rate 67 BPM    Atrial Rate 67 BPM    P-R Interval 220 ms    QRS Duration 74 ms    Q-T Interval 460 ms    QTC Calculation (Bezet) 486 ms    Calculated P Axis 17 degrees    Calculated R Axis 18 degrees    Calculated T Axis -46 degrees    Diagnosis       Atrial-paced rhythm with prolonged AV conduction with Premature atrial   complexes  Abnormal QRS-T angle, consider primary T wave abnormality  Prolonged QT  Abnormal ECG  When compared with ECG of 08-AUG-2020 00:48,  Premature atrial complexes are now Present  Criteria for Septal infarct are no longer Present  Confirmed by Leslie Swfit ([id number]) on 8/8/2020 3:09:36 PM     EKG, 12 LEAD, INITIAL    Collection Time: 08/08/20 11:07 PM   Result Value Ref Range    Ventricular Rate 74 BPM    Atrial Rate 74 BPM    P-R Interval 212 ms    QRS Duration 78 ms    Q-T Interval 420 ms    QTC Calculation (Bezet) 466 ms    Calculated P Axis 36 degrees    Calculated R Axis 29 degrees    Calculated T Axis 13 degrees    Diagnosis       Electronic atrial pacemaker  ST & T wave abnormality, consider inferior ischemia  Prolonged QT  Abnormal ECG  When compared with ECG of 08-AUG-2020 11:00,  Premature atrial complexes are no longer Present  Nonspecific T wave abnormality now evident in Lateral leads     METABOLIC PANEL, BASIC    Collection Time: 08/09/20  3:42 AM   Result Value Ref Range    Sodium 138 136 - 145 mmol/L    Potassium 4.0 3.5 - 5.1 mmol/L    Chloride 107 98 - 107 mmol/L    CO2 21 21 - 32 mmol/L    Anion gap 10 7 - 16 mmol/L    Glucose 102 (H) 65 - 100 mg/dL    BUN 16 8 - 23 MG/DL    Creatinine 1.09 0.8 - 1.5 MG/DL    GFR est AA >60 >60 ml/min/1.73m2    GFR est non-AA >60 >60 ml/min/1.73m2    Calcium 8.6 8.3 - 10.4 MG/DL   MAGNESIUM    Collection Time: 08/09/20  3:42 AM   Result Value Ref Range Magnesium 2.4 1.8 - 2.4 mg/dL         Patient Instructions:   Current Discharge Medication List      START taking these medications    Details   aspirin 81 mg chewable tablet Take 1 Tab by mouth daily. Qty: 30 Tab, Refills: 11      clopidogreL (PLAVIX) 75 mg tab Take 1 Tab by mouth daily. Qty: 30 Tab, Refills: 11      losartan (COZAAR) 50 mg tablet Take 1 Tab by mouth daily. Qty: 30 Tab, Refills: 5      rosuvastatin (CRESTOR) 20 mg tablet Take 1 Tab by mouth nightly. Qty: 30 Tab, Refills: 11      sotaloL (BETAPACE) 160 mg tablet Take 1 Tab by mouth every twelve (12) hours. Qty: 60 Tab, Refills: 11         CONTINUE these medications which have NOT CHANGED    Details   ergocalciferol (ERGOCALCIFEROL) 1,250 mcg (50,000 unit) capsule Take 1 Cap by mouth every seven (7) days. Qty: 20 Cap, Refills: 0      folic acid (FOLVITE) 1 mg tablet Take 1 mg by mouth as needed. cyanocobalamin 1,000 mcg tablet Take 1,000 mcg by mouth as needed.          STOP taking these medications       lisinopriL (PRINIVIL, ZESTRIL) 10 mg tablet Comments:   Reason for Stopping:         metoprolol succinate (TOPROL-XL) 25 mg XL tablet Comments:   Reason for Stopping:                 Signed:  KATHLEEN Jarquin  8/9/2020  8:17 AM

## 2020-08-09 NOTE — ROUTINE PROCESS
Bedside and Verbal shift change report given to self (oncoming nurse) by Samuel Mcnamara RN (offgoing nurse). Report included the following information SBAR, Kardex, Intake/Output, MAR, Recent Results and Cardiac Rhythm Apaced.

## 2020-08-09 NOTE — DISCHARGE INSTRUCTIONS
Patient Education   Patient Education      DISPOSITION: The patient is being discharged home in stable condition on a low saturated fat, low cholesterol and low salt diet. Pt is instructed to advance activities as tolerated limited to fatigue or shortness of breath. Pt is instructed not to lift anything over 5-10 lbs with affected arm. No pushing or pulling or lifting arm above shoulder. See complete discharge instructions. Pt is instructed to watch PM site for bleeding/oozing, if seen Pt is instructed to apply firm pressure with clean cloth and call office at [phone]. Pt is instructed to watch for signs of infection which include increasing area of redness around site, fever/hot to touch or purulent drainage. Pt is instructed to call office or return to ER for immediate evaluation of any shortness of breath, chest pain or palpitations. DISCHARGE SUMMARY from Nurse    PATIENT INSTRUCTIONS:    After general anesthesia or intravenous sedation, for 24 hours or while taking prescription Narcotics:  · Limit your activities  · Do not drive and operate hazardous machinery  · Do not make important personal or business decisions  · Do  not drink alcoholic beverages  · If you have not urinated within 8 hours after discharge, please contact your surgeon on call. Report the following to your surgeon:  · Excessive pain, swelling, redness or odor of or around the surgical area  · Temperature over 100.5  · Nausea and vomiting lasting longer than 4 hours or if unable to take medications  · Any signs of decreased circulation or nerve impairment to extremity: change in color, persistent  numbness, tingling, coldness or increase pain  · Any questions    What to do at Home:  Recommended activity: No driving, heavy lifting, pushing, pulling with the pacer side until cleared by MD at follow up appointment, also no heavy lifting greater than a gallon of milk with Right arm due to heart cath for 3 more days.     If you experience any of the following symptoms chest pain, shortness of breath, irregular heart beat, please follow up with Cardiology or go to the ER. *  Please give a list of your current medications to your Primary Care Provider. *  Please update this list whenever your medications are discontinued, doses are      changed, or new medications (including over-the-counter products) are added. *  Please carry medication information at all times in case of emergency situations. These are general instructions for a healthy lifestyle:    No smoking/ No tobacco products/ Avoid exposure to second hand smoke  Surgeon General's Warning:  Quitting smoking now greatly reduces serious risk to your health. Obesity, smoking, and sedentary lifestyle greatly increases your risk for illness    A healthy diet, regular physical exercise & weight monitoring are important for maintaining a healthy lifestyle    You may be retaining fluid if you have a history of heart failure or if you experience any of the following symptoms:  Weight gain of 3 pounds or more overnight or 5 pounds in a week, increased swelling in our hands or feet or shortness of breath while lying flat in bed. Please call your doctor as soon as you notice any of these symptoms; do not wait until your next office visit. The discharge information has been reviewed with the patient. The patient verbalized understanding. Discharge medications reviewed with the patient and appropriate educational materials and side effects teaching were provided. ___________________________________________________________________________________________________________________________________     Percutaneous Coronary Intervention: What to Expect at [facility]    Percutaneous coronary intervention (PCI) is the name for procedures that are used to open a narrowed or blocked coronary artery.  The two most common PCI procedures are coronary angioplasty and coronary stent placement. Your groin or arm may have a bruise and feel sore for a day or two after a percutaneous coronary intervention (PCI). You can do light activities around the house, but nothing strenuous for several days. This care sheet gives you a general idea about how long it will take for you to recover. But each person recovers at a different pace. Follow the steps below to get better as quickly as possible. How can you care for yourself at home? Activity  · If the doctor gave you a sedative:  ? For 24 hours, don't do anything that requires attention to detail, such as going to work, making important decisions, or signing any legal documents. It takes time for the medicine's effects to completely wear off.  ? For your safety, do not drive or operate any machinery that could be dangerous. Wait until the medicine wears off and you can think clearly and react easily. · Do not do strenuous exercise and do not lift, pull, or push anything heavy until your doctor says it is okay. This may be for a day or two. You can walk around the house and do light activity, such as cooking. · If the catheter was placed in your groin, try not to walk up stairs for the first couple of days. · If the catheter was placed in your arm near your wrist, do not bend your wrist deeply for the first couple of days. Be careful using your hand to get into and out of a chair or bed. · Carry your stent identification card with you at all times. · If your doctor recommends it, get more exercise. Walking is a good choice. Bit by bit, increase the amount you walk every day. Try for at least 30 minutes on most days of the week. Diet  · Drink plenty of fluids to help your body flush out the dye. If you have kidney, heart, or liver disease and have to limit fluids, talk with your doctor before you increase the amount of fluids you drink. · Keep eating a heart-healthy diet that has lots of fruits, vegetables, and whole grains.  If you have not been eating this way, talk to your doctor. You also may want to talk to a dietitian. This expert can help you to learn about healthy foods and plan meals. Medicines  · Your doctor will tell you if and when you can restart your medicines. He or she will also give you instructions about taking any new medicines. · If you take aspirin or some other blood thinner, ask your doctor if and when to start taking it again. Make sure that you understand exactly what your doctor wants you to do. · Your doctor will prescribe blood-thinning medicines. You will likely take aspirin plus another antiplatelet, such as clopidogrel (Plavix). It is very important that you take these medicines exactly as directed. These medicines help keep the coronary artery open and reduce your risk of a heart attack. · Call your doctor if you think you are having a problem with your medicine. Care of the catheter site  · For 1 or 2 days, keep a bandage over the spot where the catheter was inserted. The bandage probably will fall off in this time. · Put ice or a cold pack on the area for 10 to 20 minutes at a time to help with soreness or swelling. Put a thin cloth between the ice and your skin. · You may shower 24 to 48 hours after the procedure, if your doctor okays it. Pat the incision dry. · Do not soak the catheter site until it is healed. Don't take a bath for 1 week, or until your doctor tells you it is okay. · Watch for bleeding from the site. A small amount of blood (up to the size of a quarter) on the bandage can be normal.  · If you are bleeding, lie down and press on the area for 15 minutes to try to make it stop. If the bleeding does not stop, call your doctor or seek immediate medical care. Follow-up care is a key part of your treatment and safety. Be sure to make and go to all appointments, and call your doctor if you are having problems.  It's also a good idea to know your test results and keep a list of the medicines you take.  When should you call for help? [id number] anytime you think you may need emergency care. For example, call if:  · You passed out (lost consciousness). · You have severe trouble breathing. · You have sudden chest pain and shortness of breath, or you cough up blood. · You have symptoms of a heart attack, such as:  ? Chest pain or pressure. ? Sweating. ? Shortness of breath. ? Nausea or vomiting. ? Pain that spreads from the chest to the neck, jaw, or one or both shoulders or arms. ? Dizziness or lightheadedness. ? A fast or uneven pulse. After calling 911, chew 1 adult-strength aspirin. Wait for an ambulance. Do not try to drive yourself. · You have been diagnosed with angina, and you have angina symptoms that do not go away with rest or are not getting better within 5 minutes after you take one dose of nitroglycerin. Call your doctor now or seek immediate medical care if:  · You are bleeding from the area where the catheter was put in your artery. · You have a fast-growing, painful lump at the catheter site. · You have signs of infection, such as:  ? Increased pain, swelling, warmth, or redness. ? Red streaks leading from the catheter site. ? Pus draining from the catheter site. ? A fever. · Your leg, arm, or hand is painful, looks blue, or feels cold, numb, or tingly. Watch closely for changes in your health, and be sure to contact your doctor if you have any problems. Where can you learn more? Go to http://www.gray.com/  Enter Y640 in the search box to learn more about \"Percutaneous Coronary Intervention: What to Expect at Home. \"  Current as of: December 16, 2019               Content Version: 12.5  © 9524-7676 [employer]. Care instructions adapted under license by [employer] (which disclaims liability or warranty for this information).  If you have questions about a medical condition or this instruction, always ask your healthcare professional. [address] any warranty or liability for your use of this information. Pacemaker Placement: What to Expect at [facility] placement is surgery to put a pacemaker in your chest. This surgery may be done if you have bradycardia (a slow heart rate). A pacemaker is a small, battery-powered device. It sends electrical signals to the heart. These signals work to keep the heartbeat steady. Thin wires, called leads, carry the signals from the pacemaker to the heart. A pacemaker can prevent or reduce dizziness, fainting, and shortness of breath caused by a slow or unsteady heartbeat. Your chest may be sore where the doctor made the cut (incision) and put in the pacemaker. You also may have a bruise and mild swelling. These symptoms usually get better in 1 to 2 weeks. You may feel a hard ridge along the incision. This usually gets softer in the months after surgery. You may be able to see or feel the outline of the pacemaker under your skin. You will probably be able to go back to work or your usual routine 1 to 2 weeks after surgery. Pacemaker batteries usually last 5 to 15 years. Your doctor will talk to you about how often you will need to have your pacemaker checked. You'll need to take steps to safely use electric devices. Some of these devices can stop your pacemaker from working right for a short time. Check with your doctor about what to avoid and what to keep a short distance away from your pacemaker. For example, you will need to stay away from things with strong magnetic and electrical fields. An example is an MRI machine (unless your pacemaker is safe for an MRI). You can use a cell phone and other wireless devices but keep them at least 6 inches away from your pacemaker. Many household and office electronics do not affect a pacemaker. These include kitchen appliances and computers.   This care sheet gives you a general idea about how long it will take for you to recover. But each person recovers at a different pace. Follow the steps below to get better as quickly as possible. How can you care for yourself at home? Activity  · Rest when you feel tired. · Be active. Walking is a good choice. · For 4 to 6 weeks:  ? Avoid activities that strain your chest or upper arm muscles. This includes pushing a  or vacuum, or mopping floors. It also includes swimming, or swinging a golf club or tennis racquet. ? Do not raise your arm (the one on the side of your body where the pacemaker is located) above your shoulder. ? Allow your body to heal. Don't move quickly or lift anything heavy until you are feeling better. · Many people are able to return to work within 1 to 2 weeks after surgery. · Ask your doctor when it is okay for you to have sex. Diet  · You can eat your normal diet. If your stomach is upset, try bland, low-fat foods like plain rice, broiled chicken, toast, and yogurt. Medicines  · Your doctor will tell you if and when you can restart your medicines. He or she will also give you instructions about taking any new medicines. · If you take aspirin or some other blood thinner, be sure to talk to your doctor. He or she will tell you if and when to start taking this medicine again. Make sure that you understand exactly what your doctor wants you to do. · Be safe with medicines. Read and follow all instructions on the label. ? If the doctor gave you a prescription medicine for pain, take it as prescribed. ? If you are not taking a prescription pain medicine, ask your doctor if you can take an over-the-counter medicine. ? Do not take aspirin, ibuprofen (Advil, Motrin), naproxen (Aleve), or other nonsteroidal anti-inflammatory drugs (NSAIDs) unless your doctor says it is okay. · If your doctor prescribed antibiotics, take them as directed. Do not stop taking them just because you feel better.  You need to take the full course of antibiotics. Incision care  · If you have strips of tape on the incision, leave the tape on for a week or until it falls off. · Keep the incision dry while it heals. Your doctor may recommend sponge baths for about 7 days, but do not get the incision wet. Your doctor will let you know when you may take showers. After a shower, pat the incision dry. · Don't use hydrogen peroxide or alcohol on the incision, which can slow healing. You may cover the area with a gauze bandage if it oozes fluid or rubs against clothing. Change the bandage every day. · Do not take a bath or get into a hot tub for the first 2 weeks, or until your doctor tells you it is okay. Other instructions  · Keep a medical ID card with you at all times that says you have a pacemaker. The card should include the  and model information. · Wear medical alert jewelry stating that you have a pacemaker. You can buy this at most [employer]. · Check your pulse as directed by your doctor. · Have your pacemaker checked as often as your doctor recommends. In some cases, this may be done over the phone or the Internet. Your doctor will give you instructions about how to do this. Follow-up care is a key part of your treatment and safety. Be sure to make and go to all appointments, and call your doctor if you are having problems. It's also a good idea to know your test results and keep a list of the medicines you take. When should you call for help? [id number] anytime you think you may need emergency care. For example, call if:  · You passed out (lost consciousness). · You have trouble breathing. Call your doctor now or seek immediate medical care if:  · You are dizzy or light-headed, or you feel like you may faint. · You have pain that does not get better after you take pain medicine. · You hear an alarm or feel a vibration from your pacemaker. · You have loose stitches, or your incision comes open.   · Bright red blood has soaked through the bandage over your incision. · You have signs of infection, such as:  ? Increased pain, swelling, warmth, or redness. ? Red streaks leading from the incision. ? Pus draining from the incision. ? A fever. Watch closely for changes in your health, and be sure to contact your doctor if:  · You have any problems with your pacemaker. Where can you learn more? Go to http://www.gray.com/  Enter G550 in the search box to learn more about \"Pacemaker Placement: What to Expect at Home. \"  Current as of: December 16, 2019               Content Version: 12.5  © 5780-1659 [employer]. Care instructions adapted under license by [employer] (which disclaims liability or warranty for this information). If you have questions about a medical condition or this instruction, always ask your healthcare professional. [address] any warranty or liability for your use of this information. Patient Education   Sotalol (By mouth)   Sotalol (NORBERTO-ta-lol)  Treats heart rhythm problems. This medicine is a beta blocker. Brand Name(s): Betapace, Betapace AF, Sorine, Sotylize   There may be other brand names for this medicine. When This Medicine Should Not Be Used: This medicine is not right for everyone. Do not use it if you had an allergic reaction to sotalol, or you have certain heart or lung problems. Talk with your doctor about what these problems are. How to Use This Medicine:   Liquid, Tablet  · Take your medicine as directed. Your dose may need to be changed several times to find what works best for you. · Measure the oral liquid medicine with a marked measuring spoon, oral syringe, or medicine cup. · Contact your doctor or pharmacist before your supply of this medicine starts to run low. Do not allow yourself to run out of medicine. · Read and follow the patient instructions that come with this medicine.  Talk to your doctor or pharmacist if you have any questions. · Missed dose: Take a dose as soon as you remember. If it is almost time for your next dose, wait until then and take a regular dose. Do not take extra medicine to make up for a missed dose. · Store the medicine in a closed container at room temperature, away from heat, moisture, and direct light. Drugs and Foods to Avoid:   Ask your doctor or pharmacist before using any other medicine, including over-the-counter medicines, vitamins, and herbal products. · Some foods and medicines can affect how sotalol works. Tell your doctor if you are using the following:  ¨ Albuterol, clonidine, digoxin, guanethidine, isoproterenol, reserpine, terbutaline  ¨ Blood pressure medicines  ¨ Insulin or diabetes medicine  ¨ Medicine to treat depression  ¨ Medicine to treat an infection  ¨ Other medicine to treat heart rhythm problems, such as amiodarone, disopyramide, procainamide, or quinidine  ¨ Phenothiazine medicine (such as chlorpromazine, perphenazine, prochlorperazine, promethazine, thioridazine)  · If you are taking an antacid that contains aluminum or magnesium hydroxide, take it 2 hours before or 2 hours after you take sotalol. Warnings While Using This Medicine:   · Tell your doctor if you are pregnant or breastfeeding, or if you have kidney disease, diabetes, heart disease, angina, low blood pressure, lung or breathing problems, overactive thyroid, or a history of severe allergic reactions or heart attack. · This medicine may cause increased heart rhythm problems while your dose is being adjusted. · Do not stop using this medicine suddenly. Your doctor will need to slowly decrease your dose before you stop it completely. You could have worsening chest pain or heart rhythm problems if you stop using this medicine suddenly. · This medicine may raise or lower your blood sugar level. · This medicine may make you dizzy.  Do not drive or do anything else that could be dangerous until you know how this medicine affects you. Stand up slowly if you feel dizzy or lightheaded. · Tell any doctor or dentist who treats you that you are using this medicine. · Your doctor will do lab tests at regular visits to check on the effects of this medicine. Keep all appointments. ECG tests will be needed to check for unwanted effects. · Keep all medicine out of the reach of children. Never share your medicine with anyone. Possible Side Effects While Using This Medicine:   Call your doctor right away if you notice any of these side effects:  · Allergic reaction: Itching or hives, swelling in your face or hands, swelling or tingling in your mouth or throat, chest tightness, trouble breathing  · Chest pain  · Dry mouth, increased thirst, muscle cramps, nausea or vomiting  · Fainting, dizziness, lightheadedness  · Fast, slow, or irregular heartbeat  · Rapid weight gain, swelling in your hands, feet, or ankles, trouble breathing  If you notice these less serious side effects, talk with your doctor:   · Diarrhea  · Increased sweating  · Tiredness or weakness  If you notice other side effects that you think are caused by this medicine, tell your doctor. Call your doctor for medical advice about side effects. You may report side effects to FDA at 8-457-FDA-8715  © 2017 [facility] Information is for End User's use only and may not be sold, redistributed or otherwise used for commercial purposes. The above information is an  only. It is not intended as medical advice for individual conditions or treatments. Talk to your doctor, nurse or pharmacist before following any medical regimen to see if it is safe and effective for you. Patient Education      Rosuvastatin (Crestor) - (By mouth)   Why this medicine is used:   Treats high cholesterol and triglyceride levels.   Contact a nurse or doctor right away if you have:  · Dark urine or pale stools, diarrhea, nausea, vomiting, loss of appetite  · Yellow skin or eyes  · Pain in your upper stomach  · Change in how much or how often you urinate, cloudy urine, painful urination  · Muscle pain, tenderness, weakness; unusual tiredness     Common side effects:  · Headache  © 2017 [address] Information is for End User's use only and may not be sold, redistributed or otherwise used for commercial purposes. Patient Education      Clopidogrel (Plavix) - (By mouth)   Why this medicine is used:   Helps prevent stroke, heart attack, and other heart problems. Contact a nurse or doctor right away if you have:  · Sudden or severe headache  · Bloody vomit or vomit that looks like coffee grounds; bloody or black, tarry stools  · Bleeding that does not stop or bruises that do not heal  · Dark urine or pale stools, nausea, loss of appetite, stomach pain,  · Yellow skin or eyes     Common side effects:  · Minor bleeding or bruising  © 2017 [address] Information is for End User's use only and may not be sold, redistributed or otherwise used for commercial purposes.

## 2020-08-09 NOTE — PROGRESS NOTES
Problem: Falls - Risk of  Goal: *Absence of Falls  Description: Document [place] Fall Risk and appropriate interventions in the flowsheet.   Outcome: Resolved/Met     Problem: Patient Education: Go to Patient Education Activity  Goal: Patient/Family Education  Outcome: Resolved/Met

## 2020-08-09 NOTE — ROUTINE PROCESS
Discharge instructions reviewed with patient. Prescriptions given for crestor, losartan, plavix, and sotalol and med info sheets provided for all new medications. Opportunity for questions provided. Patient voiced understanding of all discharge instructions. IVs removed. Heart monitor returned to monitor room.

## 2020-08-09 NOTE — PROGRESS NOTES
Pt is for discharge home today with no needs/supportive care orders recieved for CM at this time. Care Management Interventions  PCP Verified by CM: Yes(Dedra Reyes in [facility] and a PCP in [place])  Mode of Transport at Discharge: (spouse)  Transition of Care Consult (CM Consult): Discharge Planning(Pt is insured with pharmacy benefits.)  Discharge Durable Medical Equipment: No  Physical Therapy Consult: No  Occupational Therapy Consult: No  Speech Therapy Consult: No  Current Support Network: Lives with Spouse, Own Home(Pt and spouse live between [address] [place] [address] and [facility].   They consider [place] address as their primary address.  )  Confirm Follow Up Transport: Family  Name of the Patient Representative Who was Provided with a Choice of Provider and Agrees with the Discharge Plan: pt/spouse  [employer] Information Provided?: No  Discharge Location  Discharge Placement: Home

## 2020-08-10 ENCOUNTER — PATIENT OUTREACH (OUTPATIENT)
Dept: CASE MANAGEMENT | Age: 75
End: 2020-08-10

## 2020-08-10 NOTE — PROGRESS NOTES
Transition of Care Hospital Discharge Follow-Up      Date/Time:  8/10/2020 2:31 PM    Patient was admitted to [facility] on 2020 and discharged on 2020 for Pacemaker. The physician discharge summary was available at the time of outreach. Patient was contacted within 7 business days of discharge. Inpatient RUR score: 4  Was this a readmission? no   Patient stated reason for the readmission: none  Patients top risk factors for readmission: Pacemaker      [facility] Care Coordinator contacted the patient by telephone to perform post hospital discharge assessment. Verified name and  with patient as identifiers. Provided introduction to self, and explanation of the Care Coordinator role. Patient received hospital discharge instructions. LPN reviewed discharge instructions and red flags with patient who verbalized understanding. Patient given an opportunity to ask questions and does not have any further questions or concerns at this time. The patient agrees to contact the PCP office for questions related to their healthcare. LPN provided contact information for future reference. Home Health orders at discharge: [address]:   Date of initial or scheduled visit:   (Assist with coordination of ervices if necessary.)    Durable Medical Equipment ordered at discharge: none  [address]:   [address] [facility] received:   (Assist patient in obtaining DME orders &/or equipment if necessary.)    Medication(s):   Medication review was performed with patient, who verbalizes understanding of administration of home medications. There were no barriers to obtaining medications identified at this time. Current Outpatient Medications   Medication Sig    aspirin 81 mg chewable tablet Take 1 Tab by mouth daily.  clopidogreL (PLAVIX) 75 mg tab Take 1 Tab by mouth daily.  losartan (COZAAR) 50 mg tablet Take 1 Tab by mouth daily.     rosuvastatin (CRESTOR) 20 mg tablet Take 1 Tab by mouth nightly.  sotaloL (BETAPACE) 160 mg tablet Take 1 Tab by mouth every twelve (12) hours.  ergocalciferol (ERGOCALCIFEROL) 1,250 mcg (50,000 unit) capsule Take 1 Cap by mouth every seven (7) days.  folic acid (FOLVITE) 1 mg tablet Take 1 mg by mouth as needed.  cyanocobalamin 1,000 mcg tablet Take 1,000 mcg by mouth as needed. No current facility-administered medications for this visit. There are no discontinued medications. ADL assessment:   (If patient is unable to perform ADLs  what is the limiting factor(s)? Do they have a support system that can assist? If no support system is present, discuss possible assistance that they may be able to obtain. Escalate for SW if ongoing issues are verbalized by pt or anticipated)    [facility] follow up appointment(s): No future appointments. Non-[facility] follow up appointment(s):   7 Day follow up with PCP or Specialist: Patient states he will call Dr. Carissa Moore office for a f/u appointment. Transportation arranged: none    Covid Risk Education    Patient has following risk factors of: Pacemaker. Education provided regarding infection prevention, and signs and symptoms of COVID-19 and when to seek medical attention with patient who verbalized understanding. Discussed exposure protocols and quarantine From CDC: Are you at higher risk for severe illness?  and given an opportunity for questions and concerns. The patient agrees to contact the COVID-19 hotline [phone] or PCP office for questions related to COVID-19. For more information on steps you can take to protect yourself, see CDC's How to Protect Yourself     Patient/family/caregiver given information for [facility] and agrees to enroll no  Patient's preferred e-mail: declines  Patient's preferred phone number: declines      Any other questions or concerns expressed by patient? Patient voiced no concerns@ this time.     Scheduled next follow up call or referral to CTN/ ACM:  Next follow up call:within 14 days    Goals Addressed                 This Visit's Progress     Takes Medications as prescribed   On track

## 2020-08-24 ENCOUNTER — PATIENT OUTREACH (OUTPATIENT)
Dept: CASE MANAGEMENT | Age: 75
End: 2020-08-24

## 2020-08-24 NOTE — PROGRESS NOTES
Attempted to call patient for [facility] [facility] call, unable to reach or to leave a voice message. According to [facility] patient had a f/u appointment with Dr. Michelle Warner 8/18/2020. This Care Coordinator will graduate this patient from this program, patient will be added back if phone call is returned. Episode Resolved.